# Patient Record
Sex: FEMALE | Race: WHITE | URBAN - METROPOLITAN AREA
[De-identification: names, ages, dates, MRNs, and addresses within clinical notes are randomized per-mention and may not be internally consistent; named-entity substitution may affect disease eponyms.]

---

## 2022-03-18 PROBLEM — Z78.0 MENOPAUSE: Status: ACTIVE | Noted: 2021-02-17

## 2022-03-19 PROBLEM — H93.11 TINNITUS OF RIGHT EAR: Status: ACTIVE | Noted: 2021-02-22

## 2022-03-19 PROBLEM — E66.9 OBESITY: Status: ACTIVE | Noted: 2020-09-18

## 2022-03-19 PROBLEM — H90.3 SENSORINEURAL HEARING LOSS (SNHL) OF BOTH EARS: Status: ACTIVE | Noted: 2021-02-22

## 2022-03-19 PROBLEM — K57.90 DIVERTICULOSIS: Status: ACTIVE | Noted: 2021-04-08

## 2022-03-19 PROBLEM — G47.30 SLEEP APNEA: Status: ACTIVE | Noted: 2020-09-18

## 2022-03-19 PROBLEM — A60.00 GENITAL HERPES SIMPLEX: Status: ACTIVE | Noted: 2020-09-18

## 2023-05-23 RX ORDER — PANTOPRAZOLE SODIUM 40 MG/1
TABLET, DELAYED RELEASE ORAL
COMMUNITY
Start: 2022-05-12

## 2023-05-23 RX ORDER — IBUPROFEN 800 MG/1
800 TABLET ORAL EVERY 8 HOURS PRN
COMMUNITY
Start: 2021-04-08 | End: 2023-06-30

## 2023-05-23 RX ORDER — NAPROXEN 500 MG/1
500 TABLET ORAL 2 TIMES DAILY WITH MEALS
COMMUNITY
Start: 2021-04-08 | End: 2023-06-30

## 2023-05-23 RX ORDER — ASPIRIN 81 MG/1
TABLET ORAL
COMMUNITY
Start: 2022-05-12 | End: 2023-06-30

## 2023-05-23 RX ORDER — HYDROCHLOROTHIAZIDE 12.5 MG/1
TABLET ORAL
COMMUNITY
Start: 2022-03-03

## 2023-05-23 RX ORDER — TERBINAFINE HYDROCHLORIDE 250 MG/1
250 TABLET ORAL DAILY
COMMUNITY
Start: 2022-08-15 | End: 2023-06-30

## 2023-05-23 RX ORDER — NYSTATIN 100000 U/G
CREAM TOPICAL 2 TIMES DAILY
COMMUNITY
Start: 2021-04-08

## 2023-06-07 ENCOUNTER — TELEPHONE (OUTPATIENT)
Age: 63
End: 2023-06-07

## 2023-06-07 NOTE — TELEPHONE ENCOUNTER
I left a voicemail to please give us a call to schedule an appointment.  Patient was in Dr. Troy Sanchez appointment wait list.

## 2023-06-09 ENCOUNTER — TELEPHONE (OUTPATIENT)
Age: 63
End: 2023-06-09

## 2023-06-20 VITALS — BODY MASS INDEX: 39.33 KG/M2 | HEIGHT: 66 IN

## 2023-06-30 ENCOUNTER — OFFICE VISIT (OUTPATIENT)
Age: 63
End: 2023-06-30
Payer: MEDICAID

## 2023-06-30 VITALS
HEIGHT: 65 IN | HEART RATE: 82 BPM | OXYGEN SATURATION: 98 % | WEIGHT: 241 LBS | SYSTOLIC BLOOD PRESSURE: 145 MMHG | DIASTOLIC BLOOD PRESSURE: 93 MMHG | BODY MASS INDEX: 40.15 KG/M2 | RESPIRATION RATE: 18 BRPM

## 2023-06-30 DIAGNOSIS — N39.46 MIXED STRESS AND URGE URINARY INCONTINENCE: ICD-10-CM

## 2023-06-30 DIAGNOSIS — Z01.419 GYNECOLOGIC EXAM NORMAL: Primary | ICD-10-CM

## 2023-06-30 DIAGNOSIS — N95.1 MENOPAUSAL SYNDROME: ICD-10-CM

## 2023-06-30 PROCEDURE — 99396 PREV VISIT EST AGE 40-64: CPT | Performed by: OBSTETRICS & GYNECOLOGY

## 2023-06-30 RX ORDER — ACYCLOVIR 400 MG/1
400 TABLET ORAL 3 TIMES DAILY
Qty: 30 TABLET | Refills: 0 | Status: SHIPPED | OUTPATIENT
Start: 2023-06-30 | End: 2023-07-10

## 2023-06-30 RX ORDER — OXYBUTYNIN CHLORIDE 5 MG/1
5 TABLET, EXTENDED RELEASE ORAL DAILY
Qty: 30 TABLET | Refills: 3 | Status: SHIPPED | OUTPATIENT
Start: 2023-06-30

## 2023-06-30 RX ORDER — DULAGLUTIDE 4.5 MG/.5ML
4.5 INJECTION, SOLUTION SUBCUTANEOUS WEEKLY
COMMUNITY

## 2023-06-30 ASSESSMENT — ENCOUNTER SYMPTOMS
GASTROINTESTINAL NEGATIVE: 1
RESPIRATORY NEGATIVE: 1

## 2023-07-06 ENCOUNTER — OFFICE VISIT (OUTPATIENT)
Age: 63
End: 2023-07-06
Payer: MEDICAID

## 2023-07-06 VITALS
HEART RATE: 75 BPM | WEIGHT: 241 LBS | BODY MASS INDEX: 40.15 KG/M2 | DIASTOLIC BLOOD PRESSURE: 83 MMHG | SYSTOLIC BLOOD PRESSURE: 135 MMHG | RESPIRATION RATE: 16 BRPM | HEIGHT: 65 IN

## 2023-07-06 DIAGNOSIS — M20.5X1 HALLUX LIMITUS, RIGHT: ICD-10-CM

## 2023-07-06 DIAGNOSIS — M79.671 RIGHT FOOT PAIN: Primary | ICD-10-CM

## 2023-07-06 PROCEDURE — 73630 X-RAY EXAM OF FOOT: CPT | Performed by: PODIATRIST

## 2023-07-06 PROCEDURE — 99214 OFFICE O/P EST MOD 30 MIN: CPT | Performed by: PODIATRIST

## 2023-07-10 LAB

## 2023-07-14 ENCOUNTER — TELEPHONE (OUTPATIENT)
Age: 63
End: 2023-07-14

## 2023-07-24 ASSESSMENT — ENCOUNTER SYMPTOMS
EYES NEGATIVE: 1
RESPIRATORY NEGATIVE: 1
GASTROINTESTINAL NEGATIVE: 1
ALLERGIC/IMMUNOLOGIC NEGATIVE: 1

## 2023-07-24 NOTE — PROGRESS NOTES
[facility] (Opening Sept 2023)  [address], MOB [address] [facility]  Rupesh Rodgers  O: [phone]  F: [phone]    [facility] [place] [address], [facility], [address] [place] [address] Reanna Penny  O: [phone]  F: [phone]    * Available via [facility] [place] 24/7

## 2023-07-28 ENCOUNTER — TELEPHONE (OUTPATIENT)
Age: 63
End: 2023-07-28

## 2023-08-21 RX ORDER — NICOTINE POLACRILEX 2 MG
GUM BUCCAL
COMMUNITY

## 2023-08-21 RX ORDER — MULTIVIT WITH MINERALS/LUTEIN
250 TABLET ORAL DAILY
COMMUNITY

## 2023-08-21 RX ORDER — ASCORBATE CALCIUM/BIOFLAVONOID 1000-200MG
TABLET, EXTENDED RELEASE ORAL
COMMUNITY

## 2023-08-25 ENCOUNTER — APPOINTMENT (OUTPATIENT)
Facility: HOSPITAL | Age: 63
End: 2023-08-25
Attending: PODIATRIST
Payer: MEDICAID

## 2023-08-25 ENCOUNTER — ANESTHESIA EVENT (OUTPATIENT)
Facility: HOSPITAL | Age: 63
End: 2023-08-25
Payer: MEDICAID

## 2023-08-25 ENCOUNTER — ANESTHESIA (OUTPATIENT)
Facility: HOSPITAL | Age: 63
End: 2023-08-25
Payer: MEDICAID

## 2023-08-25 ENCOUNTER — HOSPITAL ENCOUNTER (OUTPATIENT)
Facility: HOSPITAL | Age: 63
Setting detail: OUTPATIENT SURGERY
Discharge: HOME | End: 2023-08-25
Attending: PODIATRIST | Admitting: COLON & RECTAL SURGERY
Payer: MEDICAID

## 2023-08-25 VITALS
TEMPERATURE: 97.8 F | HEART RATE: 71 BPM | BODY MASS INDEX: 40.82 KG/M2 | HEIGHT: 65 IN | OXYGEN SATURATION: 93 % | SYSTOLIC BLOOD PRESSURE: 138 MMHG | DIASTOLIC BLOOD PRESSURE: 80 MMHG | WEIGHT: 245 LBS | RESPIRATION RATE: 22 BRPM

## 2023-08-25 DIAGNOSIS — M20.5X1 MALLET TOE OF RIGHT FOOT: Primary | ICD-10-CM

## 2023-08-25 DIAGNOSIS — M20.5X1 HALLUX LIMITUS, RIGHT: Primary | ICD-10-CM

## 2023-08-25 LAB
CA-I BLD-MCNC: 1.05 MMOL/L (ref 1.12–1.32)
CHLORIDE BLD-SCNC: 109 MMOL/L (ref 98–107)
CREAT UR-MCNC: 0.41 MG/DL (ref 0.6–1.3)
GLUCOSE BLD STRIP.AUTO-MCNC: 94 MG/DL (ref 65–100)
POTASSIUM BLD-SCNC: 3.9 MMOL/L (ref 3.5–5.5)
SODIUM BLD-SCNC: 141 MMOL/L (ref 136–145)

## 2023-08-25 PROCEDURE — 6370000000 HC RX 637 (ALT 250 FOR IP): Performed by: PODIATRIST

## 2023-08-25 PROCEDURE — 28285 REPAIR OF HAMMERTOE: CPT | Performed by: PODIATRIST

## 2023-08-25 PROCEDURE — 28750 FUSION OF BIG TOE JOINT: CPT | Performed by: PODIATRIST

## 2023-08-25 PROCEDURE — 3600000014 HC SURGERY LEVEL 4 ADDTL 15MIN: Performed by: PODIATRIST

## 2023-08-25 PROCEDURE — 7100000000 HC PACU RECOVERY - FIRST 15 MIN: Performed by: PODIATRIST

## 2023-08-25 PROCEDURE — 3700000000 HC ANESTHESIA ATTENDED CARE: Performed by: PODIATRIST

## 2023-08-25 PROCEDURE — 76000 FLUOROSCOPY <1 HR PHYS/QHP: CPT

## 2023-08-25 PROCEDURE — 76000 FLUOROSCOPY <1 HR PHYS/QHP: CPT | Performed by: PODIATRIST

## 2023-08-25 PROCEDURE — 80047 BASIC METABLC PNL IONIZED CA: CPT

## 2023-08-25 PROCEDURE — 2580000003 HC RX 258: Performed by: PODIATRIST

## 2023-08-25 PROCEDURE — 7100000011 HC PHASE II RECOVERY - ADDTL 15 MIN: Performed by: PODIATRIST

## 2023-08-25 PROCEDURE — 6360000002 HC RX W HCPCS: Performed by: ANESTHESIOLOGY

## 2023-08-25 PROCEDURE — 73620 X-RAY EXAM OF FOOT: CPT

## 2023-08-25 PROCEDURE — 6370000000 HC RX 637 (ALT 250 FOR IP): Performed by: ANESTHESIOLOGY

## 2023-08-25 PROCEDURE — 6360000002 HC RX W HCPCS: Performed by: PODIATRIST

## 2023-08-25 PROCEDURE — 7100000001 HC PACU RECOVERY - ADDTL 15 MIN: Performed by: PODIATRIST

## 2023-08-25 PROCEDURE — 3700000001 HC ADD 15 MINUTES (ANESTHESIA): Performed by: PODIATRIST

## 2023-08-25 PROCEDURE — C1713 ANCHOR/SCREW BN/BN,TIS/BN: HCPCS | Performed by: PODIATRIST

## 2023-08-25 PROCEDURE — 20680 REMOVAL OF IMPLANT DEEP: CPT | Performed by: PODIATRIST

## 2023-08-25 PROCEDURE — 2500000003 HC RX 250 WO HCPCS: Performed by: NURSE ANESTHETIST, CERTIFIED REGISTERED

## 2023-08-25 PROCEDURE — 3600000004 HC SURGERY LEVEL 4 BASE: Performed by: PODIATRIST

## 2023-08-25 PROCEDURE — 7100000010 HC PHASE II RECOVERY - FIRST 15 MIN: Performed by: PODIATRIST

## 2023-08-25 PROCEDURE — 6360000002 HC RX W HCPCS: Performed by: NURSE ANESTHETIST, CERTIFIED REGISTERED

## 2023-08-25 PROCEDURE — 2709999900 HC NON-CHARGEABLE SUPPLY: Performed by: PODIATRIST

## 2023-08-25 DEVICE — ALLOSYNC DBM PUTTY 5CC: Type: IMPLANTABLE DEVICE | Site: FOOT | Status: FUNCTIONAL

## 2023-08-25 DEVICE — IMPLANTABLE DEVICE: Type: IMPLANTABLE DEVICE | Site: FIRST TOE | Status: FUNCTIONAL

## 2023-08-25 DEVICE — SCREW CORTEX 3X14: Type: IMPLANTABLE DEVICE | Site: FIRST TOE | Status: FUNCTIONAL

## 2023-08-25 DEVICE — MAXFORCE MTP PLATE PETITE 0 DEGREE -0 DEGREE RIGHT: Type: IMPLANTABLE DEVICE | Site: FIRST TOE | Status: FUNCTIONAL

## 2023-08-25 DEVICE — LP SCREW 3.0X16MM CORTICAL MTP TI: Type: IMPLANTABLE DEVICE | Site: FIRST TOE | Status: FUNCTIONAL

## 2023-08-25 RX ORDER — OXYCODONE HYDROCHLORIDE 5 MG/1
5 TABLET ORAL
Status: COMPLETED | OUTPATIENT
Start: 2023-08-25 | End: 2023-08-25

## 2023-08-25 RX ORDER — ACETAMINOPHEN AND CODEINE PHOSPHATE 300; 30 MG/1; MG/1
1 TABLET ORAL EVERY 4 HOURS PRN
Qty: 18 TABLET | Refills: 0 | Status: SHIPPED | OUTPATIENT
Start: 2023-08-25 | End: 2023-08-26

## 2023-08-25 RX ORDER — CLINDAMYCIN PHOSPHATE 900 MG/50ML
900 INJECTION INTRAVENOUS ONCE
Status: COMPLETED | OUTPATIENT
Start: 2023-08-25 | End: 2023-08-25

## 2023-08-25 RX ORDER — ONDANSETRON 2 MG/ML
INJECTION INTRAMUSCULAR; INTRAVENOUS PRN
Status: DISCONTINUED | OUTPATIENT
Start: 2023-08-25 | End: 2023-08-25 | Stop reason: SDUPTHER

## 2023-08-25 RX ORDER — FENTANYL CITRATE 50 UG/ML
INJECTION, SOLUTION INTRAMUSCULAR; INTRAVENOUS PRN
Status: DISCONTINUED | OUTPATIENT
Start: 2023-08-25 | End: 2023-08-25 | Stop reason: SDUPTHER

## 2023-08-25 RX ORDER — SODIUM CHLORIDE 0.9 % (FLUSH) 0.9 %
5-40 SYRINGE (ML) INJECTION PRN
Status: DISCONTINUED | OUTPATIENT
Start: 2023-08-25 | End: 2023-08-25 | Stop reason: HOSPADM

## 2023-08-25 RX ORDER — ASPIRIN 81 MG/1
81 TABLET ORAL DAILY
Qty: 42 TABLET | Refills: 0 | Status: SHIPPED | OUTPATIENT
Start: 2023-08-25 | End: 2023-10-06

## 2023-08-25 RX ORDER — SODIUM CHLORIDE, SODIUM LACTATE, POTASSIUM CHLORIDE, CALCIUM CHLORIDE 600; 310; 30; 20 MG/100ML; MG/100ML; MG/100ML; MG/100ML
INJECTION, SOLUTION INTRAVENOUS CONTINUOUS
Status: DISCONTINUED | OUTPATIENT
Start: 2023-08-25 | End: 2023-08-25 | Stop reason: HOSPADM

## 2023-08-25 RX ORDER — FENTANYL CITRATE 50 UG/ML
25 INJECTION, SOLUTION INTRAMUSCULAR; INTRAVENOUS EVERY 5 MIN PRN
Status: COMPLETED | OUTPATIENT
Start: 2023-08-25 | End: 2023-08-25

## 2023-08-25 RX ORDER — ONDANSETRON 2 MG/ML
4 INJECTION INTRAMUSCULAR; INTRAVENOUS
Status: DISCONTINUED | OUTPATIENT
Start: 2023-08-25 | End: 2023-08-25 | Stop reason: HOSPADM

## 2023-08-25 RX ORDER — HYDROMORPHONE HYDROCHLORIDE 1 MG/ML
0.5 INJECTION, SOLUTION INTRAMUSCULAR; INTRAVENOUS; SUBCUTANEOUS EVERY 5 MIN PRN
Status: DISCONTINUED | OUTPATIENT
Start: 2023-08-25 | End: 2023-08-25 | Stop reason: HOSPADM

## 2023-08-25 RX ORDER — ONDANSETRON 4 MG/1
4 TABLET, ORALLY DISINTEGRATING ORAL EVERY 8 HOURS PRN
Status: DISCONTINUED | OUTPATIENT
Start: 2023-08-25 | End: 2023-08-25 | Stop reason: HOSPADM

## 2023-08-25 RX ORDER — DEXAMETHASONE SODIUM PHOSPHATE 4 MG/ML
INJECTION, SOLUTION INTRA-ARTICULAR; INTRALESIONAL; INTRAMUSCULAR; INTRAVENOUS; SOFT TISSUE PRN
Status: DISCONTINUED | OUTPATIENT
Start: 2023-08-25 | End: 2023-08-25 | Stop reason: SDUPTHER

## 2023-08-25 RX ORDER — MIDAZOLAM HYDROCHLORIDE 1 MG/ML
INJECTION INTRAMUSCULAR; INTRAVENOUS PRN
Status: DISCONTINUED | OUTPATIENT
Start: 2023-08-25 | End: 2023-08-25 | Stop reason: SDUPTHER

## 2023-08-25 RX ORDER — HYDROMORPHONE HYDROCHLORIDE 1 MG/ML
INJECTION, SOLUTION INTRAMUSCULAR; INTRAVENOUS; SUBCUTANEOUS PRN
Status: DISCONTINUED | OUTPATIENT
Start: 2023-08-25 | End: 2023-08-25 | Stop reason: SDUPTHER

## 2023-08-25 RX ORDER — POVIDONE-IODINE 10 MG/G
OINTMENT TOPICAL PRN
Status: DISCONTINUED | OUTPATIENT
Start: 2023-08-25 | End: 2023-08-25 | Stop reason: ALTCHOICE

## 2023-08-25 RX ORDER — ONDANSETRON 2 MG/ML
4 INJECTION INTRAMUSCULAR; INTRAVENOUS EVERY 6 HOURS PRN
Status: DISCONTINUED | OUTPATIENT
Start: 2023-08-25 | End: 2023-08-25 | Stop reason: HOSPADM

## 2023-08-25 RX ORDER — OXYCODONE HYDROCHLORIDE AND ACETAMINOPHEN 5; 325 MG/1; MG/1
1 TABLET ORAL EVERY 6 HOURS PRN
Qty: 12 TABLET | Refills: 0 | Status: SHIPPED | OUTPATIENT
Start: 2023-08-25 | End: 2023-08-26

## 2023-08-25 RX ADMIN — CLINDAMYCIN PHOSPHATE 900 MG: 900 INJECTION, SOLUTION INTRAVENOUS at 13:41

## 2023-08-25 RX ADMIN — ONDANSETRON 4 MG: 2 INJECTION INTRAMUSCULAR; INTRAVENOUS at 13:54

## 2023-08-25 RX ADMIN — PROPOFOL 150 MG: 10 INJECTION, EMULSION INTRAVENOUS at 13:50

## 2023-08-25 RX ADMIN — OXYCODONE HYDROCHLORIDE 5 MG: 5 TABLET ORAL at 16:28

## 2023-08-25 RX ADMIN — FENTANYL CITRATE 100 MCG: 50 INJECTION, SOLUTION INTRAMUSCULAR; INTRAVENOUS at 13:47

## 2023-08-25 RX ADMIN — SODIUM CHLORIDE, POTASSIUM CHLORIDE, SODIUM LACTATE AND CALCIUM CHLORIDE: 600; 310; 30; 20 INJECTION, SOLUTION INTRAVENOUS at 11:06

## 2023-08-25 RX ADMIN — HYDROMORPHONE HYDROCHLORIDE 0.5 MG: 1 INJECTION, SOLUTION INTRAMUSCULAR; INTRAVENOUS; SUBCUTANEOUS at 14:59

## 2023-08-25 RX ADMIN — LIDOCAINE HYDROCHLORIDE 40 MG: 20 INJECTION, SOLUTION EPIDURAL; INFILTRATION; INTRACAUDAL; PERINEURAL at 13:50

## 2023-08-25 RX ADMIN — MIDAZOLAM HYDROCHLORIDE 2 MG: 2 INJECTION, SOLUTION INTRAMUSCULAR; INTRAVENOUS at 13:41

## 2023-08-25 RX ADMIN — DEXAMETHASONE SODIUM PHOSPHATE 4 MG: 4 INJECTION, SOLUTION INTRA-ARTICULAR; INTRALESIONAL; INTRAMUSCULAR; INTRAVENOUS; SOFT TISSUE at 13:54

## 2023-08-25 RX ADMIN — HYDROMORPHONE HYDROCHLORIDE 0.5 MG: 1 INJECTION, SOLUTION INTRAMUSCULAR; INTRAVENOUS; SUBCUTANEOUS at 15:09

## 2023-08-25 RX ADMIN — FENTANYL CITRATE 25 MCG: 50 INJECTION, SOLUTION INTRAMUSCULAR; INTRAVENOUS at 15:46

## 2023-08-25 RX ADMIN — FENTANYL CITRATE 25 MCG: 50 INJECTION, SOLUTION INTRAMUSCULAR; INTRAVENOUS at 15:38

## 2023-08-25 ASSESSMENT — PAIN SCALES - GENERAL
PAINLEVEL_OUTOF10: 6
PAINLEVEL_OUTOF10: 5
PAINLEVEL_OUTOF10: 6
PAINLEVEL_OUTOF10: 4
PAINLEVEL_OUTOF10: 5
PAINLEVEL_OUTOF10: 5
PAINLEVEL_OUTOF10: 4
PAINLEVEL_OUTOF10: 3
PAINLEVEL_OUTOF10: 3
PAINLEVEL_OUTOF10: 6
PAINLEVEL_OUTOF10: 6

## 2023-08-25 ASSESSMENT — PAIN DESCRIPTION - ORIENTATION
ORIENTATION: RIGHT

## 2023-08-25 ASSESSMENT — PAIN DESCRIPTION - LOCATION
LOCATION: FOOT
LOCATION: TOE (COMMENT WHICH ONE)
LOCATION: FOOT
LOCATION: FOOT

## 2023-08-25 ASSESSMENT — PAIN - FUNCTIONAL ASSESSMENT: PAIN_FUNCTIONAL_ASSESSMENT: NONE - DENIES PAIN

## 2023-08-25 NOTE — H&P
Patient is a 66 y.o. female who is being seen as a returning patient for right foot pain. Patient states that she did have surgery many years prior but states she is now having pain to the medial aspect of the foot. She states pain rises to the level of 4 out of 10 and is exacerbated with activity. She denies breaks skin or local/systemic signs of infection. She denies any recent diagnostic testing to interrogate her concern. She denies any changes in her shoe gear but does admit to a decreased activity level due to the pain. She denies any other lower extremity complaints            Name:  Tonia Judd             Age:  66 y.o.  :  1960                                                 MRN:  [id number]                                                                      Date:  2023              Surgeon: Rajiv Singh):  Nanette Jansen DPM     Procedure: Procedure(s):  REMOVAL OF ORTHOPEDIC HARDWARE WITH FIRST METATARSALPHLANGEAL JOINT ARTHRODESIS, RIGHT FOOT     Medications prior to admission:   Home Medications           Prior to Admission medications    Medication Sig Start Date End Date Taking?  Authorizing Provider   B Complex-C (VITAMIN B + C COMPLEX PO) Take by mouth     Yes Historical Provider, MD   LYSINE PO Take by mouth     Yes Historical Provider, MD   MILK THISTLE PO Take by mouth     Yes Historical Provider, MD   vitamin E 100 units capsule Take 1 capsule by mouth daily     Yes Historical Provider, MD   Coenzyme Q10 (CO Q 10 PO) Take by mouth     Yes Historical Provider, MD   Omega 3-6-9 Fatty Acids (OMEGA-3-6-9 PO) Take by mouth     Yes Historical Provider, MD   Multiple Minerals (CALCIUM-MAGNESIUM-ZINC) TABS Take by mouth     Yes Historical Provider, MD   Ascorbic Acid (VITAMIN C) 250 MG tablet Take 1 tablet by mouth daily     Yes Historical Provider, MD   Biotin 1 MG CAPS Take by mouth     Yes Historical Provider, MD   oxybutynin (DITROPAN-XL) 5 MG extended release tablet Take 1 PREGSERUM, HCG, HCGQUANT      ABGs: No results found for: PHART, PO2ART, JIA2ABX, HSN8MWO, BEART, F5VCZHPT      Type & Screen (If Applicable):  No results found for: LABABO, LABRH     Drug/Infectious Status (If Applicable):  No results found for: HIV, HEPCAB     COVID-19 Screening (If Applicable): No results found for: COVID19           Anesthesia Evaluation  Patient summary reviewed and Nursing notes reviewed no history of anesthetic complications:   Airway: Mallampati: II  TM distance: >3 FB   Neck ROM: full  Mouth opening: > = 3 FB    Dental: normal exam          Pulmonary:normal exam  breath sounds clear to auscultation  (+) sleep apnea: on CPAP,                                          Cardiovascular:     (+) hypertension:,            Rhythm: regular  Rate: normal                           Neuro/Psych:   Negative Neuro/Psych ROS                GI/Hepatic/Renal:   (+) GERD:, PUD, morbid obesity             Endo/Other:                              Abdominal:              PE comment: Deferred. Vascular: negative vascular ROS.              Other Findings:          Procedure Information    Case: [id number] Date/Time: 08/25/23 1220   Procedure: REMOVAL OF ORTHOPEDIC HARDWARE WITH FIRST METATARSALPHLANGEAL JOINT ARTHRODESIS, RIGHT FOOT (Right)   Anesthesia type: General   Diagnosis: Right foot pain [M79.671]   Pre-op diagnosis: Right foot pain [M79.671]   Location: [facility] MAIN OR 05 / [facility] MAIN OR   Surgeons: Monster Vizcarra DPM

## 2023-08-25 NOTE — OP NOTE
[address] [place] [address] PODIATRY & FOOT SURGERY    Operative Note      Patient: Olya Shelton  YOB: 1960  MRN: [id number]    Date of Procedure:   08/25/2023    Pre-Op Diagnosis Codes:  Painful orthopedic hardware, right foot  Hallux limitus, right foot  Mallet toe, right second toe  Mallet toe, right third toe    Post-Op Diagnosis:   Same       Procedure(s):  REMOVAL OF ORTHOPEDIC HARDWARE, RIGHT FOOT  FIRST METATARSAL PHLANGEAL JOINT ARTHRODESIS, RIGHT FOOT  MALLET TOE CORRECTION, RIGHT FOOT SECOND TOE  MALLET TOE CORRECTION, RIGHT FOOT THIRD TOE  FLUOROSCOPIC EXAM, RIGHT FOOT    Surgeon(s):  Blaine Brown DPM    Assistant:   Surgical Assistant: Jada Steel    Anesthesia:   General with local, consisting of 10cc of 1% lidocaine plain    Estimated Blood Loss (mL):   Minimal    Complications:   None    Specimens:   None    Implants:  Implant Name Type Inv. Item Serial No.  Lot No. LRB No. Used Action   ALLOSYNC DBM PUTTY 5CC - SNA  ALLOSYNC DBM PUTTY 5CC NA [employer] [id number] Right 1 Implanted         Drains:   None    Findings:   Mallet toe deformities to the right second and third toe. Retained orthopedic hardware to the proximal phalanx of the right great toe. Extensive joint space narrowing and cartilage loss to the right first metatarsophalangeal joint    Detailed Description of Procedure:   Pt was seen in the pre-operative holding area and all questions were answered and all concerns were addressed. The operative procedures were discussed in great detail, with all possible complications highlighted. Pt verbalized complete understanding and the consent was signed and witnessed. Pt was given clindamycin 900mg IV for surgical prophylaxis. The operative limb was marked and the pt was transported to the operating room. The pt was transferred to the operating table and anesthesia was administered as indicated above.  The RLE was scrubbed and draped in 5-325, which she will utilize for her post op pain. She is to keep her splint C/D/I. She is to f/u in the office in (3) weeks. Phil Cali DPM, [facility], [address]  [address], [address], [address] [facility] [address]  O: [phone]  F: [phone]    [address] (Opening Sept 2023)  [address] [employer] [facility], [address] [place] [address] [address] [place] [address]  O: [phone]  F: [phone]    [address] [place] [address], [address] [place] [address]  O: [phone]  F: [phone]    * Available via [address] [place] [address] 24/7      Electronically signed by Brii Butts DPM on 8/25/2023 at 3:11 PM

## 2023-08-25 NOTE — PERIOP NOTE
Patient alert and oriented x4, VS stable, no complaints of pain at time of discharge. Discharge instructions/education provided to Edu Escalera, gema stoddard, he verbalized understanding and had no questions. Patient was discharged in wheelchair to home with gema stoddard at main entrance of hospital via private vehicle.

## 2023-08-25 NOTE — PROGRESS NOTES
Pharmacy called stating that there is no Percocet at this time. Prescribe patient Tylenol 3 to help with her postoperative pain.   Discussed with Dr. Cash Chan

## 2023-08-25 NOTE — ANESTHESIA POSTPROCEDURE EVALUATION
Department of Anesthesiology  Postprocedure Note    Patient: Magno Chambers  MRN: [id number]  YOB: 1960  Date of evaluation: 8/25/2023      Procedure Summary     Date: 08/25/23 Room / Location: [facility] MAIN OR 05 / [facility] MAIN OR    Anesthesia Start: 1341 Anesthesia Stop: 1480    Procedure: REMOVAL OF ORTHOPEDIC HARDWARE FIRST METATARSAL PHLANGEAL JOINT ARTHRODESIS AND SECOND/THIRD MALLET TOE CORRECTION, RIGHT FOOT (Right: Foot) Diagnosis:       Right foot pain      (Right foot pain [M79.671])    Surgeons: Betty Cuevas DPM Responsible Provider: Keira Rico MD    Anesthesia Type: MAC, General ASA Status: 3          Anesthesia Type: MAC, General    Mark Phase I: Mark Score: 9    Mark Phase II:        Anesthesia Post Evaluation    Patient location during evaluation: PACU  Patient participation: complete - patient participated  Level of consciousness: awake  Pain score: 0  Airway patency: patent  Nausea & Vomiting: no nausea and no vomiting  Complications: no  Cardiovascular status: hemodynamically stable  Respiratory status: acceptable  Hydration status: stable  Multimodal analgesia pain management approach

## 2023-08-26 DIAGNOSIS — M79.671 RIGHT FOOT PAIN: Primary | ICD-10-CM

## 2023-08-26 RX ORDER — OXYCODONE HYDROCHLORIDE AND ACETAMINOPHEN 5; 325 MG/1; MG/1
1 TABLET ORAL EVERY 6 HOURS PRN
Qty: 12 TABLET | Refills: 0 | Status: SHIPPED | OUTPATIENT
Start: 2023-08-26 | End: 2023-08-29

## 2023-08-30 DIAGNOSIS — Z98.890 POST-OPERATIVE STATE: Primary | ICD-10-CM

## 2023-09-08 ENCOUNTER — OFFICE VISIT (OUTPATIENT)
Age: 63
End: 2023-09-08
Payer: MEDICAID

## 2023-09-08 VITALS — HEIGHT: 65 IN | WEIGHT: 245 LBS | BODY MASS INDEX: 40.82 KG/M2

## 2023-09-08 DIAGNOSIS — N39.46 MIXED STRESS AND URGE URINARY INCONTINENCE: ICD-10-CM

## 2023-09-08 PROCEDURE — 99213 OFFICE O/P EST LOW 20 MIN: CPT | Performed by: OBSTETRICS & GYNECOLOGY

## 2023-09-08 RX ORDER — OXYBUTYNIN CHLORIDE 5 MG/1
5 TABLET, EXTENDED RELEASE ORAL DAILY
Qty: 90 TABLET | Refills: 2 | Status: SHIPPED | OUTPATIENT
Start: 2023-09-08

## 2023-09-08 ASSESSMENT — ENCOUNTER SYMPTOMS
GASTROINTESTINAL NEGATIVE: 1
RESPIRATORY NEGATIVE: 1

## 2023-09-08 NOTE — PROGRESS NOTES
Lukas Robison is a , 66 y.o. female   No LMP recorded. Patient is postmenopausal.    She presents for her problem    She is having no significant problems. Since doing kegels and taking Ditropan- sxs have improved. Had recent foot surgery- doing well      Menstrual status:  Cycles are menopausal.    Flow: absent. She does not have dysmenorrhea. Medical conditions:  Since her last annual GYN exam about one year ago, she has not the following changes in her health history: none. Mammogram History:    YAEL Results (most recent):  @Corduro(VMO0831:1)@     DEXA Results (most recent):  @Corduro(KFZ4992:1)@       Past Medical History:   Diagnosis Date    Arthritis     Genital herpes     GERD (gastroesophageal reflux disease) 2022    Hypertension     Menopause 2021    PONV (postoperative nausea and vomiting)     Sleep apnea     cpap    Stomach ulcer 2022     Past Surgical History:   Procedure Laterality Date    ARTHRODESIS Right 2023    1. REMOVAL OF ORTHOPEDIC HARDWARE, RIGHT FOOT 2. FIRST METATARSAL PHLANGEAL JOINT ARTHRODESIS, RIGHT FOOT 3. MALLET TOE CORRECTION, RIGHT FOOT SECOND TOE 4. MALLET TOE CORRECTION, RIGHT FOOT THIRD TOE 5. FLUOROSCOPIC EXAM, RIGHT FOOT performed by Deanne Tran DPM at [facility]      ENDOMETRIAL CRYOABLATION      GI  2022    endoscopy    HEENT      HYSTEROSCOPY      ORTHOPEDIC SURGERY  2022    hip replacement     TONSILLECTOMY AND ADENOIDECTOMY      TOTAL HIP ARTHROPLASTY Left        Prior to Admission medications    Medication Sig Start Date End Date Taking?  Authorizing Provider   oxybutynin (DITROPAN-XL) 5 MG extended release tablet Take 1 tablet by mouth daily 23  Yes Rachel Manzo MD   aspirin 81 MG EC tablet Take 1 tablet by mouth daily 8/25/23 10/6/23 Yes Deanne Tran DPM   B Complex-C (VITAMIN B + C COMPLEX PO) Take by mouth   Yes Historical Provider, MD   LYSINE PO

## 2023-09-14 ENCOUNTER — OFFICE VISIT (OUTPATIENT)
Age: 63
End: 2023-09-14

## 2023-09-14 VITALS
SYSTOLIC BLOOD PRESSURE: 125 MMHG | WEIGHT: 245 LBS | BODY MASS INDEX: 40.82 KG/M2 | HEART RATE: 69 BPM | DIASTOLIC BLOOD PRESSURE: 88 MMHG | HEIGHT: 65 IN | RESPIRATION RATE: 16 BRPM

## 2023-09-14 DIAGNOSIS — Z98.890 POST-OPERATIVE STATE: Primary | ICD-10-CM

## 2023-09-14 PROCEDURE — 99024 POSTOP FOLLOW-UP VISIT: CPT | Performed by: PODIATRIST

## 2023-09-21 ENCOUNTER — OFFICE VISIT (OUTPATIENT)
Age: 63
End: 2023-09-21
Payer: MEDICAID

## 2023-09-21 VITALS
DIASTOLIC BLOOD PRESSURE: 92 MMHG | HEIGHT: 65 IN | RESPIRATION RATE: 16 BRPM | HEART RATE: 82 BPM | SYSTOLIC BLOOD PRESSURE: 112 MMHG | WEIGHT: 245 LBS | BODY MASS INDEX: 40.82 KG/M2

## 2023-09-21 DIAGNOSIS — Z98.890 POST-OPERATIVE STATE: Primary | ICD-10-CM

## 2023-09-21 PROCEDURE — 73620 X-RAY EXAM OF FOOT: CPT | Performed by: PODIATRIST

## 2023-09-21 PROCEDURE — 99024 POSTOP FOLLOW-UP VISIT: CPT | Performed by: PODIATRIST

## 2023-10-03 ENCOUNTER — OFFICE VISIT (OUTPATIENT)
Age: 63
End: 2023-10-03

## 2023-10-03 VITALS
OXYGEN SATURATION: 98 % | HEART RATE: 72 BPM | SYSTOLIC BLOOD PRESSURE: 159 MMHG | TEMPERATURE: 97.6 F | DIASTOLIC BLOOD PRESSURE: 98 MMHG

## 2023-10-03 DIAGNOSIS — Z98.890 STATUS POST FOOT SURGERY: Primary | ICD-10-CM

## 2023-10-03 PROCEDURE — 99024 POSTOP FOLLOW-UP VISIT: CPT | Performed by: PODIATRIST

## 2023-10-03 RX ORDER — CLOTRIMAZOLE 1 %
CREAM (GRAM) TOPICAL
Qty: 45 G | Refills: 1 | Status: SHIPPED | OUTPATIENT
Start: 2023-10-03 | End: 2023-10-10

## 2023-10-03 RX ORDER — AMMONIUM LACTATE 12 G/100G
CREAM TOPICAL
Qty: 385 G | Refills: 4 | Status: SHIPPED | OUTPATIENT
Start: 2023-10-03

## 2023-10-03 NOTE — PROGRESS NOTES
Status post bunionectomy performed by Dr. Romie Richard. Patient presented for bandage change. Patient has a small opening to incision site. Patient's apply triple antibiotic and cover with a Band-Aid. Patient to discontinue using cam boot and return to regular shoe.

## 2023-10-13 NOTE — PROGRESS NOTES
[employer] [facility] PODIATRY & FOOT SURGERY      HPI:  Patient is being seen for first postoperative visit status post right foot surgery. Patient states she has had moderate pain since the procedure. She states she has elevated her right lower extremity, but admittingly not as much as possible. She is applying ice behind the knee. She has kept her dressing/splint clean, dry, intact. She states she has remained strict NWB to the right foot. She denies any local/systemic signs infection. She denies any other pedal complaints       ROS:  A complete review of systems is unremarkable outside of HPI       PE:  Upon removing the surgical dressing, surgical site noted to be intact with no signs of infection or dehiscence. Intense edema noted to the surgical site       ASSESSMENT:  S/p - DOS 08/25/2023  REMOVAL OF ORTHOPEDIC HARDWARE, RIGHT FOOT  FIRST METATARSAL PHLANGEAL JOINT ARTHRODESIS, RIGHT FOOT  HAMMERTOE CORRECTION, RIGHT FOOT SECOND TOE  HAMMERTOE CORRECTION, RIGHT FOOT THIRD TOE  FLUOROSCOPIC EXAM, RIGHT FOOT       PLAN:  Surgical dressing removed to the right lower extremity. Due to swelling, plan for suture removal next office visit. A new well-padded posterior splint applied to the right lower extremity. Patient to remain strict nonweightbearing. She is to elevate above the level heart for swelling/pain reduction. She is to keep the splint clean/dry/intact          Phil BERRY  [address] [facility], [facility], [facility], [address]  [address] [facility], [address], [address]  O: [phone]  F: [phone]    [address] [place] [address] Street: [phone]  F: [phone]    [address] [place] [address]  O: [phone]  F: [phone]    * Available

## 2023-10-19 NOTE — PROGRESS NOTES
[facility] [employer] [facility] PODIATRY & FOOT SURGERY      HPI:  Patient is being seen for second postoperative visit status post right foot surgery. Patient states she has had mild pain since her last office visit. She states she has elevated her right lower extremity, but admittingly not as much as possible. She is applying ice behind the knee. She has kept her dressing/splint clean, dry, intact. She states she has remained strict NWB to the right foot. She denies any local/systemic signs infection. She denies any other pedal complaints       ROS:  A complete review of systems is unremarkable outside of HPI       PE:  Upon removing the splint/dressing, surgical site noted to be intact with no signs of infection or dehiscence. Mild/moderate edema noted to the surgical site      IMAGES:  EXAM: XR RIGHT FOOT MIN 2 V     INDICATION: Post op evaluation     FINDINGS: Two views of the right foot demonstrates no acute fracture or dislocation. Intact orthopedic hardware with osseous bridging noted to the arthrodesis site. Moderate soft tissue edema noted. There is no other acute osseous or articular abnormality. ASSESSMENT:  S/p - DOS 08/25/2023  REMOVAL OF ORTHOPEDIC HARDWARE, RIGHT FOOT  FIRST METATARSAL PHLANGEAL JOINT ARTHRODESIS, RIGHT FOOT  HAMMERTOE CORRECTION, RIGHT FOOT SECOND TOE  HAMMERTOE CORRECTION, RIGHT FOOT THIRD TOE  FLUOROSCOPIC EXAM, RIGHT FOOT       PLAN:  Dressing removed to the right lower extremity. Sutures removed without incident. A new well-padded posterior splint applied to the right lower extremity. Patient to remain strict nonweightbearing. She is to elevate above the level heart for swelling/pain reduction. She is to keep the splint clean/dry/intact          Phil BERRY  [address] [facility], [facility], [facility], [address] [place], [address], [address]  O: [phone]  F: [phone]    [facility]

## 2023-11-13 ENCOUNTER — OFFICE VISIT (OUTPATIENT)
Age: 63
End: 2023-11-13

## 2023-11-13 VITALS
DIASTOLIC BLOOD PRESSURE: 80 MMHG | BODY MASS INDEX: 40.82 KG/M2 | HEIGHT: 65 IN | SYSTOLIC BLOOD PRESSURE: 124 MMHG | WEIGHT: 245 LBS | HEART RATE: 73 BPM

## 2023-11-13 DIAGNOSIS — Z98.890 STATUS POST FOOT SURGERY: Primary | ICD-10-CM

## 2023-11-13 PROCEDURE — 99024 POSTOP FOLLOW-UP VISIT: CPT | Performed by: PODIATRIST

## (undated) DEVICE — PADDING CAST W6INXL4YD SYNTHETIC NATURAL PROTOUCH

## (undated) DEVICE — BASIC SINGLE BASIN-LF: Brand: MEDLINE INDUSTRIES, INC.

## (undated) DEVICE — MINOR EXTREMITY PACK: Brand: MEDLINE INDUSTRIES, INC.

## (undated) DEVICE — PRECISION THIN (9.0 X 0.38 X 25.0MM)

## (undated) DEVICE — Device

## (undated) DEVICE — DRAPE EQUIP C ARM 74X42 IN MOB XR W/ TIE RUBBER BND LF

## (undated) DEVICE — MAXFORCE MTP COMPRESSION DEVICE

## (undated) DEVICE — PIN BONE BB TAK
Type: IMPLANTABLE DEVICE | Site: FIRST TOE | Status: NON-FUNCTIONAL
Removed: 2023-08-25

## (undated) DEVICE — GARMENT,MEDLINE,DVT,INT,CALF,MED, GEN2: Brand: MEDLINE

## (undated) DEVICE — SCREWDRIVER SURG STD FOR CAPPING K WIRE W SER PIN BALL

## (undated) DEVICE — SUTURE PROL SZ 3-0 L36IN NONABSORBABLE BLU L26MM SH 1/2 CIR 8522H

## (undated) DEVICE — INTENDED FOR TISSUE SEPARATION, AND OTHER PROCEDURES THAT REQUIRE A SHARP SURGICAL BLADE TO PUNCTURE OR CUT.: Brand: BARD-PARKER ® STAINLESS STEEL BLADES

## (undated) DEVICE — PADDING CAST 4 INX5 YD STRL

## (undated) DEVICE — GLOVE ORANGE PI 7 1/2   MSG9075

## (undated) DEVICE — PADDING CAST W4INXL4YD ST COT RAYON MICROPLEATED HIGHLY

## (undated) DEVICE — CUFF TRNQT ADH 18X4 IN 2 FILL LL

## (undated) DEVICE — SOLUTION SURG PREP 26 CC PURPREP

## (undated) DEVICE — GAUZE,SPONGE,4"X4",16PLY,STRL,LF,10/TRAY: Brand: MEDLINE

## (undated) DEVICE — Device: Brand: LIGHT GUARD™ FLEXIBLE LIGHT HANDLE COVER (2 EACH/PKG)

## (undated) DEVICE — BANDAGE,GAUZE,BULKEE II,4.5"X4.1YD,STRL: Brand: MEDLINE

## (undated) DEVICE — SPONGE GZ W4XL4IN COT 12 PLY TYP VII WVN C FLD DSGN STERILE

## (undated) DEVICE — SCREW KRULOCK 3.0 X 16: Type: IMPLANTABLE DEVICE | Site: FIRST TOE | Status: NON-FUNCTIONAL

## (undated) DEVICE — NITI G-WIRE TROCAR .062X5.91

## (undated) DEVICE — SUTURE ABSORBABLE MONOFILAMENT 2-0 SH 27 IN VIO MONOCRYL + MCP317H

## (undated) DEVICE — GLOVE SURG SZ 8 L12IN FNGR THK79MIL GRN LTX FREE

## (undated) DEVICE — STOCKINETTE,DOUBLE PLY,6X48,STERILE: Brand: MEDLINE

## (undated) DEVICE — IMPLANTABLE DEVICE: Type: IMPLANTABLE DEVICE | Site: FIRST TOE | Status: NON-FUNCTIONAL

## (undated) DEVICE — SOUTHSIDE TURNOVER: Brand: MEDLINE INDUSTRIES, INC.

## (undated) DEVICE — PAD,ABDOMINAL,8"X7.5",STERILE,LF,1/PK: Brand: MEDLINE

## (undated) DEVICE — SOLUTION IRRIG 500ML 0.9% SOD CHLO USP POUR PLAS BTL

## (undated) DEVICE — DRAPE,ORTHOMAX,EXTREMITY: Brand: MEDLINE

## (undated) DEVICE — PREP PAD BNS: Brand: CONVERTORS